# Patient Record
Sex: MALE | Race: WHITE
[De-identification: names, ages, dates, MRNs, and addresses within clinical notes are randomized per-mention and may not be internally consistent; named-entity substitution may affect disease eponyms.]

---

## 2020-10-16 ENCOUNTER — HOSPITAL ENCOUNTER (EMERGENCY)
Dept: HOSPITAL 97 - ER | Age: 30
Discharge: HOME | End: 2020-10-16
Payer: COMMERCIAL

## 2020-10-16 VITALS — TEMPERATURE: 97.4 F | OXYGEN SATURATION: 100 % | SYSTOLIC BLOOD PRESSURE: 131 MMHG | DIASTOLIC BLOOD PRESSURE: 66 MMHG

## 2020-10-16 DIAGNOSIS — K52.9: Primary | ICD-10-CM

## 2020-10-16 LAB
ALBUMIN SERPL BCP-MCNC: 4.8 G/DL (ref 3.4–5)
ALP SERPL-CCNC: 72 U/L (ref 45–117)
ALT SERPL W P-5'-P-CCNC: 29 U/L (ref 12–78)
AST SERPL W P-5'-P-CCNC: 18 U/L (ref 15–37)
BUN BLD-MCNC: 13 MG/DL (ref 7–18)
GLUCOSE SERPLBLD-MCNC: 114 MG/DL (ref 74–106)
HCT VFR BLD CALC: 43.7 % (ref 39.6–49)
LIPASE SERPL-CCNC: 56 U/L (ref 73–393)
LYMPHOCYTES # SPEC AUTO: 1.4 K/UL (ref 0.7–4.9)
PMV BLD: 8.7 FL (ref 7.6–11.3)
POTASSIUM SERPL-SCNC: 3.4 MMOL/L (ref 3.5–5.1)
RBC # BLD: 5.14 M/UL (ref 4.33–5.43)

## 2020-10-16 PROCEDURE — 96375 TX/PRO/DX INJ NEW DRUG ADDON: CPT

## 2020-10-16 PROCEDURE — 80048 BASIC METABOLIC PNL TOTAL CA: CPT

## 2020-10-16 PROCEDURE — 99284 EMERGENCY DEPT VISIT MOD MDM: CPT

## 2020-10-16 PROCEDURE — 36415 COLL VENOUS BLD VENIPUNCTURE: CPT

## 2020-10-16 PROCEDURE — 85025 COMPLETE CBC W/AUTO DIFF WBC: CPT

## 2020-10-16 PROCEDURE — 74177 CT ABD & PELVIS W/CONTRAST: CPT

## 2020-10-16 PROCEDURE — 83690 ASSAY OF LIPASE: CPT

## 2020-10-16 PROCEDURE — 96374 THER/PROPH/DIAG INJ IV PUSH: CPT

## 2020-10-16 PROCEDURE — 80076 HEPATIC FUNCTION PANEL: CPT

## 2020-10-16 PROCEDURE — 96361 HYDRATE IV INFUSION ADD-ON: CPT

## 2020-10-16 NOTE — RAD REPORT
EXAM DESCRIPTION:  CTAbdomen   Pelvis W Contrast - 10/16/2020 7:25 pm

 

CLINICAL HISTORY:  Abdominal pain.

ABD PAIN

 

COMPARISON:  No comparisons

 

TECHNIQUE:  Biphasic CT imaging of the abdomen and pelvis was performed with 100 ml non-ionic IV cont
rast.

 

All CT scans are performed using dose optimization technique as appropriate and may include automated
 exposure control or mA/KV adjustment according to patient size.

 

FINDINGS:  The lung bases are clear.

 

The liver, spleen, pancreas, adrenal glands and kidneys are within normal limits.

 

No bowel obstruction, free air, free fluid or abscess. Colonic stool retention is present. There is m
ild wall thickening seen in the region of the ascending colon to the level of the hepatic flexure. Th
e appendix is normal.  No evidence of significant lymphadenopathy.

 

No suspicious bony findings.

 

IMPRESSION:  Negative for appendicitis.

 

Mild colitis is suspected involving the right colon.

## 2020-10-16 NOTE — ER
Nurse's Notes                                                                                     

 [facility]                                                                 

Name: Casimiro Hollingsworth                                                                                

Age: 30 yrs                                                                                       

Sex: Male                                                                                         

: 1990                                                                                   

MRN: [id number]                                                                                   

Arrival Date: 10/16/2020                                                                          

Time: 17:28                                                                                       

Account#: [id number]                                                                            

Bed 17                                                                                            

Private MD:                                                                                       

Diagnosis: Generalized abdominal pain-Colitis                                                     

                                                                                                  

Presentation:                                                                                     

10/16                                                                                             

17:51 Chief complaint: Patient states: RLQ pain that began this morning. Coronavirus screen:  ss  

      Client denies travel out of the [place] in the last 14 days. Ebola Screen: Patient denies      

      exposure to infectious person. Patient denies travel to an Ebola-affected area in the       

      21 days before illness onset. Initial Sepsis Screen: Does the patient meet any 2            

      criteria? No. Patient's initial sepsis screen is negative. Does the patient have a          

      suspected source of infection? No. Patient's initial sepsis screen is negative. Risk        

      Assessment: Do you want to hurt yourself or someone else? Patient reports no desire to      

      harm self or others. Onset of symptoms was 2020.                                

17:51 Method Of Arrival: Ambulatory                                                           ss  

17:51 Acuity: DAHIANA 3                                                                           ss  

                                                                                                  

Historical:                                                                                       

- Allergies:                                                                                      

19:17 No Known Allergies;                                                                     jd3 

- Home Meds:                                                                                      

17:52 None [Active];                                                                          ss  

- PMHx:                                                                                           

17:52 None;                                                                                   ss  

- PSHx:                                                                                           

19:17 superior mesenteric artery syndrome repare;                                             jd3 

                                                                                                  

- Immunization history:: Adult Immunizations up to date.                                          

- Social history:: Smoking status: unknown.                                                       

                                                                                                  

                                                                                                  

Screenin:23 Abuse screen: Denies threats or abuse. Nutritional screening: No deficits noted.        jd3 

      Tuberculosis screening: No symptoms or risk factors identified. Fall Risk Ambulatory        

      Aid- None/Bed Rest/Nurse Assist (0 pts). Gait- Normal/Bed Rest/Wheelchair (0 pts)           

      Mental Status- Oriented to own ability (0 pts). Total Serna Fall Scale indicates No         

      Risk (0-24 pts).                                                                            

                                                                                                  

Assessment:                                                                                       

18:15 General: Appears in no apparent distress. uncomfortable, Behavior is calm, cooperative, jd3 

      appropriate for age. Pain: Complains of pain in right lower quadrant Quality of pain is     

      described as sharp. Neuro: Level of Consciousness is awake, alert, obeys commands,          

      Oriented to person, place, time, situation. Cardiovascular: Denies chest pain,              

      Capillary refill < 3 seconds Patient's skin is warm and dry. Respiratory: Airway is         

      patent Respiratory effort is even, unlabored, Respiratory pattern is regular,               

      symmetrical, Denies cough, shortness of breath. GI: Abdomen is round non-distended,         

      Bowel sounds present X 4 quads. Abd is soft X 4 quads Abdomen is tender to palpation in     

      right upper quadrant and right lower quadrant Reports diarrhea, nausea. : No signs        

      and/or symptoms were reported regarding the genitourinary system. EENT: No signs and/or     

      symptoms were reported regarding the EENT system. Derm: Skin is intact, Skin is dry,        

      Skin is normal, Skin temperature is warm. Musculoskeletal: Circulation, motion, and         

      sensation intact. Range of motion: intact in all extremities.                               

18:37 Reassessment: Patient appears in no apparent distress at this time. Patient and/or      jd3 

      family updated on plan of care and expected duration. Pain level reassessed. Patient is     

      alert, oriented x 3, equal unlabored respirations, skin warm/dry/pink. PO contrast          

      finished, CT notified.                                                                      

19:44 Reassessment: Patient appears in no apparent distress at this time. Patient and/or      jd3 

      family updated on plan of care and expected duration. Pain level reassessed. Patient is     

      alert, oriented x 3, equal unlabored respirations, skin warm/dry/pink. pt reports pain      

      as tolerable. awaiting CT result.                                                           

20:56 Reassessment: Patient appears in no apparent distress at this time. Patient and/or      jd3 

      family updated on plan of care and expected duration. Pain level reassessed. Patient is     

      alert, oriented x 3, equal unlabored respirations, skin warm/dry/pink. Patient states       

      feeling better.                                                                             

                                                                                                  

Vital Signs:                                                                                      

17:51  / 66; Pulse 80; Resp 18; Temp 97.4(TE); Pulse Ox 100% on R/A; Weight 74.84 kg;   ss  

      Height 6 ft. 1 in. (185.42 cm); Pain 9/10;                                                  

20:30  / 72; Pulse 82; Resp 17 S; Pulse Ox 100% on R/A;                                 jd3 

17:51 Body Mass Index 21.77 (74.84 kg, 185.42 cm)                                               

                                                                                                  

ED Course:                                                                                        

17:28 Patient arrived in ED.                                                                  ag5 

17:37 [facility]                                                        kb  

17:37 Satnam Alegria MD is Attending Physician.                                              kb  

17:51 Triage completed.                                                                       ss  

17:52 Arm band placed on right wrist.                                                         ss  

18:15 Guanakito Villalobos, RN is Primary Nurse.                                                  jd3 

18:17 Initial lab(s) drawn, by me, sent to lab. Inserted saline lock: 20 gauge in right       dh3 

      antecubital area, using aseptic technique. Blood collected.                                 

18:23 Patient has correct armband on for positive identification. Bed in low position. Call   jd3 

      light in reach. Side rails up X 1. Adult w/ patient. Pulse ox on. NIBP on.                  

19:26 CT Abd/Pelvis - PO and IV Contrast In Process Unspecified.                              EDMS

20:55 No provider procedures requiring assistance completed. IV discontinued, intact,         jd3 

      bleeding controlled, No redness/swelling at site. Pressure dressing applied.                

                                                                                                  

Administered Medications:                                                                         

18:22 Drug: NS 0.9% 1000 ml Route: IV; Rate: 1000 ml; Site: right antecubital;                jd3 

19:20 Follow up: Response: No adverse reaction; IV Status: Completed infusion; IV Intake:     jd3 

      1000ml                                                                                      

18:22 Drug: morphine 4 mg Route: IVP; Site: right antecubital;                                jd3 

19:20 Follow up: Response: No adverse reaction; RASS: Alert and Calm (0)                      jd3 

18:23 Drug: Zofran (Ondansetron) 4 mg Route: IVP; Site: right antecubital;                    jd3 

19:20 Follow up: Response: No adverse reaction                                                jd3 

18:59 CANCELLED (Duplicate Order): Zofran (Ondansetron) 4 mg IVP once; over 2 minutes         jd3 

19:00 Drug: morphine 4 mg Route: IVP; Site: right antecubital;                                jd3 

20:00 Follow up: Response: No adverse reaction; RASS: Alert and Calm (0)                      jd3 

20:42 Drug: fentaNYL (PF) 50 mcg Route: IVP; Site: right antecubital;                         jd3 

20:56 Follow up: Response: Medication administered at discharge.                              jd3 

20:43 Drug: Cipro 500 mg Route: PO;                                                           jd3 

20:57 Follow up: Response: Medication administered at discharge.                              jd3 

20:43 Drug: Flagyl 500 mg Route: PO;                                                          jd3 

20:56 Follow up: Response: Medication administered at discharge.                              jd3 

                                                                                                  

                                                                                                  

Intake:                                                                                           

19:20 IV: 1000ml; Total: 1000ml.                                                              jd3 

                                                                                                  

Outcome:                                                                                          

20:09 Discharge ordered by MD. hebetr  

20:55 Discharged to home ambulatory, with family.                                             jd3 

20:55 Condition: stable                                                                           

20:55 Discharge instructions given to patient, family, Instructed on discharge instructions,      

      follow up and referral plans. medication usage, Demonstrated understanding of               

      instructions, follow-up care, medications, Prescriptions given X 5                          

20:59 Patient left the ED.                                                                    jd3 

                                                                                                  

Signatures:                                                                                       

Dispatcher [employer]                           EDMS                                                 

Kaylene Schwarz, NAKUL-SENA MOTA-Fanta Christine, RN                      RN   [facility]                              3                                                  

Guanakito Villalobos RN                    RN   jd3                                                  

Eduardo Peters                                5                                                  

                                                                                                  

Corrections: (The following items were deleted from the chart)                                    

19:17 17:52 PSHx: None;                                                                     jd3 

21:00 20:59  / 72; Pulse 82bpm; Resp 17bpm; Spontaneous; Pulse Ox 100% RA; jd3          jd3 

                                                                                                  

**************************************************************************************************

## 2020-10-16 NOTE — EDPHYS
Physician Documentation                                                                           

 [facility]                                                                 

Name: Casimiro Hollingsworth                                                                                

Age: 30 yrs                                                                                       

Sex: Male                                                                                         

: 1990                                                                                   

MRN: [id number]                                                                                   

Arrival Date: 10/16/2020                                                                          

Time: 17:28                                                                                       

Account#: [id number]                                                                            

Bed 17                                                                                            

Private MD:                                                                                       

ED Physician Satnam Alegria                                                                       

HPI:                                                                                              

10/16                                                                                             

20:14 This 30 yrs old  Male presents to ER via Ambulatory with complaints of         kb  

      Appendicitis.                                                                               

20:14 The patient presents with abdominal pain that is diffuse. Onset: The symptoms/episode   kb  

      began/occurred this morning. The symptoms do not radiate. Associated signs and              

      symptoms: Pertinent positives: diarrhea, Pertinent negatives: nausea and vomiting,          

      fever. The symptoms are described as constant. Modifying factors: The symptoms are          

      alleviated by nothing, the symptoms are aggravated by nothing. Severity of pain: At its     

      worst the pain was moderate in the emergency department the pain is unchanged. The          

      patient has not experienced similar symptoms in the past. The patient has not recently      

      seen a physician. Pt reports diffuse abd pain, worse in RLQ, that started this morning      

      and has been getting progressively worse. Reports diarrhea as well. Denies n/v/f. .         

                                                                                                  

Historical:                                                                                       

- Allergies:                                                                                      

19:17 No Known Allergies;                                                                     jd3 

- Home Meds:                                                                                      

17:52 None [Active];                                                                          ss  

- PMHx:                                                                                           

17:52 None;                                                                                   ss  

- PSHx:                                                                                           

19:17 superior mesenteric artery syndrome repare;                                             jd3 

                                                                                                  

- Immunization history:: Adult Immunizations up to date.                                          

- Social history:: Smoking status: unknown.                                                       

                                                                                                  

                                                                                                  

ROS:                                                                                              

20:13 Constitutional: Negative for fever, chills, and weight loss, Cardiovascular: Negative   kb  

      for chest pain, palpitations, and edema, Respiratory: Negative for shortness of breath,     

      cough, wheezing, and pleuritic chest pain, Back: Negative for injury and pain,              

      MS/Extremity: Negative for injury and deformity, Skin: Negative for injury, rash, and       

      discoloration, Neuro: Negative for headache, weakness, numbness, tingling, and seizure.     

20:13 Abdomen/GI: Positive for abdominal pain, diarrhea, Negative for nausea and vomiting.        

                                                                                                  

Exam:                                                                                             

20:13 Constitutional:  This is a well developed, well nourished patient who is awake, alert,  kb  

      and in no acute distress. Head/Face:  Normocephalic, atraumatic. Chest/axilla:  Normal      

      chest wall appearance and motion.  Nontender with no deformity.  No lesions are             

      appreciated. Cardiovascular:  Regular rate and rhythm with a normal S1 and S2.  No          

      gallops, murmurs, or rubs.  Normal PMI, no JVD.  No pulse deficits. Respiratory:  Lungs     

      have equal breath sounds bilaterally, clear to auscultation and percussion.  No rales,      

      rhonchi or wheezes noted.  No increased work of breathing, no retractions or nasal          

      flaring. Skin:  Warm, dry with normal turgor.  Normal color with no rashes, no lesions,     

      and no evidence of cellulitis. MS/ Extremity:  Pulses equal, no cyanosis.                   

      Neurovascular intact.  Full, normal range of motion. Neuro:  Awake and alert, GCS 15,       

      oriented to person, place, time, and situation.  Cranial nerves II-XII grossly intact.      

      Motor strength 5/5 in all extremities.  Sensory grossly intact.  Cerebellar exam            

      normal.  Normal gait.                                                                       

20:13 Abdomen/GI: Inspection: abdomen appears normal, Bowel sounds: normal, in all quadrants,     

      Palpation: soft, in all quadrants, mild abdominal tenderness, in all quadrants.             

                                                                                                  

Vital Signs:                                                                                      

17:51  / 66; Pulse 80; Resp 18; Temp 97.4(TE); Pulse Ox 100% on R/A; Weight 74.84 kg;   ss  

      Height 6 ft. 1 in. (185.42 cm); Pain 9/10;                                                  

20:30  / 72; Pulse 82; Resp 17 S; Pulse Ox 100% on R/A;                                 jd3 

17:51 Body Mass Index 21.77 (74.84 kg, 185.42 cm)                                               

                                                                                                  

MDM:                                                                                              

17:47 Patient medically screened.                                                             kb  

20:13 Data reviewed: vital signs, nurses notes. Data interpreted: Pulse oximetry: on room air kb  

      is 100 %. Interpretation: normal. Counseling: I had a detailed discussion with the          

      patient and/or guardian regarding: the historical points, exam findings, and any            

      diagnostic results supporting the discharge/admit diagnosis, lab results, radiology         

      results, the need for outpatient follow up, a family practitioner, to return to the         

      emergency department if symptoms worsen or persist or if there are any questions or         

      concerns that arise at home.                                                                

                                                                                                  

10/16                                                                                             

17:47 Order name: Basic Metabolic Panel; Complete Time: 19:04                                 kb  

10/16                                                                                             

17:47 Order name: CBC with Diff; Complete Time: 18:32                                         kb  

10/16                                                                                             

17:47 Order name: CT Abd/Pelvis - PO and IV Contrast; Complete Time: 20:04                    kb  

10/16                                                                                             

17:47 Order name: Hepatic Function; Complete Time: 19:04                                      kb  

10/16                                                                                             

17:47 Order name: Lipase; Complete Time: 19:04                                                kb  

10/16                                                                                             

17:47 Order name: IV Saline Lock; Complete Time: 18:22                                        kb  

10/16                                                                                             

17:47 Order name: Labs collected and sent; Complete Time: 18:22                               kb  

                                                                                                  

Administered Medications:                                                                         

18:22 Drug: NS 0.9% 1000 ml Route: IV; Rate: 1000 ml; Site: right antecubital;                jd3 

19:20 Follow up: Response: No adverse reaction; IV Status: Completed infusion; IV Intake:     jd3 

      1000ml                                                                                      

18:22 Drug: morphine 4 mg Route: IVP; Site: right antecubital;                                jd3 

19:20 Follow up: Response: No adverse reaction; RASS: Alert and Calm (0)                      jd3 

18:23 Drug: Zofran (Ondansetron) 4 mg Route: IVP; Site: right antecubital;                    jd3 

19:20 Follow up: Response: No adverse reaction                                                jd3 

18:59 CANCELLED (Duplicate Order): Zofran (Ondansetron) 4 mg IVP once; over 2 minutes         jd3 

19:00 Drug: morphine 4 mg Route: IVP; Site: right antecubital;                                jd3 

20:00 Follow up: Response: No adverse reaction; RASS: Alert and Calm (0)                      jd3 

20:42 Drug: fentaNYL (PF) 50 mcg Route: IVP; Site: right antecubital;                         jd3 

20:56 Follow up: Response: Medication administered at discharge.                              jd3 

20:43 Drug: Cipro 500 mg Route: PO;                                                           jd3 

20:57 Follow up: Response: Medication administered at discharge.                              jd3 

20:43 Drug: Flagyl 500 mg Route: PO;                                                          jd3 

20:56 Follow up: Response: Medication administered at discharge.                              jd3 

                                                                                                  

                                                                                                  

Disposition:                                                                                      

10/17                                                                                             

13:28 Co-signature as Attending Physician, Satnam Alegria MD I agree with the assessment and   kdr 

      plan of care.                                                                               

                                                                                                  

Disposition:                                                                                      

10/16/20 20:09 Discharged to Home. Impression: Generalized abdominal pain - Colitis.              

- Condition is Stable.                                                                            

- Discharge Instructions: Abdominal Pain, Adult, Easy-to-Read, Colitis.                           

- Prescriptions for Bentyl 20 mg Oral Tablet - take 1 tablet by ORAL route every 6                

  hours As needed; 20 tablet. Cipro 500 mg Oral Tablet - take 1 tablet by ORAL route              

  every 12 hours for 10 days; 20 tablet. Flagyl 500 mg Oral Tablet - take 1 tablet by             

  ORAL route every 8 hours for 10 days; 30 tablet. Zofran 4 mg Oral Tablet - take 1               

  tablet by ORAL route every 6 hours As needed; 20 tablet. Tramadol 50 mg Oral Tablet -           

  take 1 tablet by ORAL route every 8 hours as needed; 12 tablet.                                 

- Medication Reconciliation Form, Thank You Letter, Antibiotic Education, Prescription            

  Opioid Use form.                                                                                

- Follow up: Emergency Department; When: As needed; Reason: Worsening of condition.               

  Follow up: Private Physician; When: 2 - 3 days; Reason: Recheck today's complaints,             

  Continuance of care, Re-evaluation by your physician.                                           

                                                                                                  

                                                                                                  

                                                                                                  

Signatures:                                                                                       

Dispatcher [employer]                           EDMS                                                 

Kaylene Schwarz, NAKUL-SENA                 FNP-Satnam Cevallos MD MD   [facility]                                                  

Fanta Borjas RN                      RN   Guanakito Melgar RN                    RN   jd3                                                  

                                                                                                  

Corrections: (The following items were deleted from the chart)                                    

10/16                                                                                             

18:59 18:58 Zofran (Ondansetron) 4 mg IVP once; over 2 minutes ordered. jd3                   jd3 

19:17 17:52 PSHx: None; ss                                                                    jd3 

20:59 20:09 10/16/2020 20:09 Discharged to Home. Impression: Generalized abdominal pain -     jd3 

      Colitis. Condition is Stable. Forms are Medication Reconciliation Form, Thank You           

      Letter, Antibiotic Education, Prescription Opioid Use. Follow up: Emergency Department;     

      When: As needed; Reason: Worsening of condition. Follow up: Private Physician; When: 2      

      - 3 days; Reason: Recheck today's complaints, Continuance of care, Re-evaluation by         

      your physician. kb                                                                          

                                                                                                  

**************************************************************************************************